# Patient Record
Sex: FEMALE | Race: WHITE
[De-identification: names, ages, dates, MRNs, and addresses within clinical notes are randomized per-mention and may not be internally consistent; named-entity substitution may affect disease eponyms.]

---

## 2023-05-16 PROBLEM — Z00.00 ENCOUNTER FOR PREVENTIVE HEALTH EXAMINATION: Status: ACTIVE | Noted: 2023-05-16

## 2023-06-01 ENCOUNTER — APPOINTMENT (OUTPATIENT)
Dept: PULMONOLOGY | Facility: CLINIC | Age: 63
End: 2023-06-01
Payer: COMMERCIAL

## 2023-06-01 VITALS
BODY MASS INDEX: 30.84 KG/M2 | WEIGHT: 153 LBS | DIASTOLIC BLOOD PRESSURE: 80 MMHG | HEART RATE: 72 BPM | SYSTOLIC BLOOD PRESSURE: 140 MMHG | HEIGHT: 59 IN | OXYGEN SATURATION: 97 %

## 2023-06-01 DIAGNOSIS — G47.33 OBSTRUCTIVE SLEEP APNEA (ADULT) (PEDIATRIC): ICD-10-CM

## 2023-06-01 DIAGNOSIS — Z99.89 OBSTRUCTIVE SLEEP APNEA (ADULT) (PEDIATRIC): ICD-10-CM

## 2023-06-01 DIAGNOSIS — I10 ESSENTIAL (PRIMARY) HYPERTENSION: ICD-10-CM

## 2023-06-01 DIAGNOSIS — I25.10 ATHEROSCLEROTIC HEART DISEASE OF NATIVE CORONARY ARTERY W/OUT ANGINA PECTORIS: ICD-10-CM

## 2023-06-01 PROCEDURE — 99202 OFFICE O/P NEW SF 15 MIN: CPT

## 2023-06-02 PROBLEM — I10 BENIGN ESSENTIAL HYPERTENSION: Status: ACTIVE | Noted: 2023-06-02

## 2023-06-02 PROBLEM — I25.10 CAD (CORONARY ARTERY DISEASE): Status: ACTIVE | Noted: 2023-06-02

## 2023-06-02 NOTE — HISTORY OF PRESENT ILLNESS
[Never] : never [Obstructive Sleep Apnea] : obstructive sleep apnea [Awakes Unrefreshed] : awakes unrefreshed [Daytime Somnolence] : daytime somnolence [Fatigue] : fatigue [Nonrestorative Sleep] : nonrestorative sleep [Snoring] : snoring [Witnessed Apneas] : witnessed apneas [TextBox_4] : Ms. Hoffman is a 63F with HTN, CAD, hyperlipidemia who presents to establish care for MARCIO. In 2021, Pt underwent home sleep study which showed e/o mild MARCIO. The study was read by Dr. Torrez and Pt tried to follow-up with him to start CPAP therapy but his office did not take her insurance. She was lost to follow-up and now presents to re-establish care.\par \par She reports poor sleep quality and does feel fatigued throughout the day. She also states that her boyfriend notices she snores and regularly has apneic episodes when sleeping. She does drink caffeine but not excessively. Does feel sleepy in the afternoons, but has never fallen asleep while doing tasks or when driving.\par \par bedtime: 10-11pm\par wake time: 6-7am [Difficulty Initiating Sleep] : does not have difficulty initiating sleep [Difficulty Maintaining Sleep] : does not have difficulty maintaining sleep [Frequent Nocturnal Awakening] : denies frequent nocturnal awakening [Hypersomnolence] : denies hypersomnolence [Unintentional Sleep while Active] : no unintentional sleep while active [Unintentional Sleep while Inactive] : no unintentional sleep while inactive [Unusual Movements] : no unusual movements

## 2023-06-02 NOTE — DISCUSSION/SUMMARY
[FreeTextEntry1] : 63F here to establish care for management of MARCIO diagnosed in 2021\par \par #MARCIO\par - Auto-PAP home study ordered today\par - Educated Pt on CPAP therapy and sleep/health benefits\par - She reports having recent difficulty with HTN management and discussed that MARCIO treatment may help with that\par - Will refer Pt to continue follow-up with Dr. Oconnor or Dr. Navarrete

## 2023-07-19 ENCOUNTER — APPOINTMENT (OUTPATIENT)
Dept: PULMONOLOGY | Facility: CLINIC | Age: 63
End: 2023-07-19